# Patient Record
Sex: FEMALE | URBAN - METROPOLITAN AREA
[De-identification: names, ages, dates, MRNs, and addresses within clinical notes are randomized per-mention and may not be internally consistent; named-entity substitution may affect disease eponyms.]

---

## 2023-01-18 ENCOUNTER — OFFICE VISIT (OUTPATIENT)
Dept: SURGERY | Age: 43
End: 2023-01-18

## 2023-01-18 VITALS
SYSTOLIC BLOOD PRESSURE: 109 MMHG | TEMPERATURE: 98.2 F | WEIGHT: 184 LBS | DIASTOLIC BLOOD PRESSURE: 66 MMHG | RESPIRATION RATE: 18 BRPM | HEART RATE: 60 BPM | BODY MASS INDEX: 32.6 KG/M2 | OXYGEN SATURATION: 98 % | HEIGHT: 63 IN

## 2023-01-18 DIAGNOSIS — R63.5 WEIGHT GAIN: Primary | ICD-10-CM

## 2023-01-18 DIAGNOSIS — Z98.84 H/O GASTRIC BYPASS: ICD-10-CM

## 2023-01-18 PROCEDURE — 99203 OFFICE O/P NEW LOW 30 MIN: CPT | Performed by: SURGERY

## 2023-01-18 RX ORDER — LEVETIRACETAM 500 MG/1
TABLET ORAL 2 TIMES DAILY
COMMUNITY

## 2023-01-18 RX ORDER — CITALOPRAM 40 MG/1
40 TABLET, FILM COATED ORAL DAILY
COMMUNITY

## 2023-01-18 RX ORDER — BUSPIRONE HYDROCHLORIDE 10 MG/1
10 TABLET ORAL 3 TIMES DAILY
COMMUNITY

## 2023-01-18 RX ORDER — LEVONORGESTREL AND ETHINYL ESTRADIOL 0.1-0.02MG
KIT ORAL
COMMUNITY

## 2023-01-18 NOTE — PROGRESS NOTES
Identified pt with two pt identifiers (name and ). Reviewed chart in preparation for visit and have obtained necessary documentation. Dorinda Anna is a 38 y.o. female  Chief Complaint   Patient presents with    New Patient     Discuss revision      Visit Vitals  /66 (BP 1 Location: Left arm, BP Patient Position: Sitting, BP Cuff Size: Adult)   Pulse 60   Temp 98.2 °F (36.8 °C) (Oral)   Resp 18   Ht 5' 3\" (1.6 m)   Wt 184 lb (83.5 kg)   SpO2 98%   BMI 32.59 kg/m²       1. Have you been to the ER, urgent care clinic since your last visit? Hospitalized since your last visit? No    2. Have you seen or consulted any other health care providers outside of the [address] [place] [address] since your last visit? Include any pap smears or colon screening.  No

## 2023-01-18 NOTE — PROGRESS NOTES
[address] [facility] at [facility] Surgery History and Physical    History of Present Illness:      Amira Chisholm is a 38 y.o. female who has a history of gastric bypass done in [place] at [facility].  She lost about 80 pounds with her original gastric bypass. She has gained about 40 to 50 pounds of those back. She is interested in considering revisional Surgery If It Is Indicated. In General She Has Gotten off Track with Her Eating Periodically at Times. She Denies Any Nausea Vomiting No GI Related Symptoms, No GERD. She Is Taking Vitamins Still. She Says She Often Eats Sweets and Sometimes Gets Dumping Syndrome from This. Past Medical History:   Diagnosis Date    Anemia     Depression     Psychotic disorder [facility])        Past Surgical History:   Procedure Laterality Date    HX GASTRECTOMY  2013    Gastric Bypass         Current Outpatient Medications:     citalopram (CeleXA) 40 mg tablet, Take 40 mg by mouth daily. , Disp: , Rfl:     busPIRone (BUSPAR) 10 mg tablet, Take 10 mg by mouth three (3) times daily. , Disp: , Rfl:     levETIRAcetam (Keppra) 500 mg tablet, Take  by mouth two (2) times a day., Disp: , Rfl:     levonorgest-eth.estradioL-iron (Balcoltra) 0.1 mg-0.02 mg (21)/36.5 mg(7) tab, Take  by mouth., Disp: , Rfl:     Not on File    Social History     Socioeconomic History    Marital status:      Spouse name: Not on file    Number of children: Not on file    Years of education: Not on file    Highest education level: Not on file   Occupational History    Not on file   Tobacco Use    Smoking status: Never    Smokeless tobacco: Never   Substance and Sexual Activity    Alcohol use: Not on file    Drug use: Not on file    Sexual activity: Not on file   Other Topics Concern    Not on file   Social History Narrative    Not on file     Social Determinants of Health     Financial Resource Strain: Not on file   Food Insecurity: Not on file   Transportation Needs: Not on file   Physical Activity: Not on file   Stress: Not on file   Social Connections: Not on file   Intimate Partner Violence: Not on file   Housing Stability: Not on file       Family History   Problem Relation Age of Onset    Hypertension Maternal Grandmother     Diabetes Maternal Grandfather        ROS   Constitutional: negative  Ears, Nose, Mouth, Throat, and Face: negative  Respiratory: negative  Cardiovascular: negative  Gastrointestinal: negative  Genitourinary:negative  Integument/Breast: negative  Hematologic/Lymphatic: negative  Behavioral/Psychiatric: negative  Allergic/Immunologic: negative      Physical Exam:     Visit Vitals  /66 (BP 1 Location: Left arm, BP Patient Position: Sitting, BP Cuff Size: Adult)   Pulse 60   Temp 98.2 °F (36.8 °C) (Oral)   Resp 18   Ht 5' 3\" (1.6 m)   Wt 184 lb (83.5 kg)   SpO2 98%   BMI 32.59 kg/m²       General - alert and oriented, no apparent distress  HEENT - no jaundice, no hearing imparement  Pulm - CTAB, no C/W/R  CV - RRR, no M/R/G  Abd -soft, nondistended, bowel sounds present, nontender to palpation  Ext - pulses intact in UE and LE bilaterally, no edema  Skin - supple, no rashes  Psychiatric - normal affect, good mood    Labs  none    Imaging  none  I have reviewed and agree with all of the pertinent images    Assessment:     Bonita Jackson is a 38 y.o. female with weight regain after gastric bypass surgery    Recommendations:     1. She has gained a total of about 40 to 50 pounds after her gastric bypass surgery. She said her high weight was 220 pounds when she had a gastric bypass. At her current BMI she is not a candidate for revisional surgery. In general her weight is low but she has had some weight regain. I am referring her to our back on track program with our dietitians and then to also to our medical weight loss team to help and see if there are any specific diets and medications that could help her with her weight regain issues.   I do not think revisional surgery would give her much weight weight loss and she also does not meet criteria either. Trent Yoon MD    Greater than half of the time: 30 minutes was used in counciling the patient about diagnosis and treatment plan    Ms. Horton has a reminder for a \"due or due soon\" health maintenance. I have asked that she contact her primary care provider for follow-up on this health maintenance.

## 2023-01-23 ENCOUNTER — TELEPHONE (OUTPATIENT)
Dept: SURGERY | Age: 43
End: 2023-01-23

## 2023-01-23 NOTE — TELEPHONE ENCOUNTER
Called patient regarding referral to our [address] [place] [address] [facility]. Patient did not answer so I left a vmail with our contact information.

## 2023-01-25 DIAGNOSIS — Z76.89 ENCOUNTER FOR WEIGHT MANAGEMENT: Primary | ICD-10-CM

## 2023-01-25 DIAGNOSIS — E66.01 MORBID OBESITY (HCC): ICD-10-CM

## 2023-01-26 NOTE — PROGRESS NOTES
Patient attended our VIRTUAL Medically Supervised Weight Loss New Patient Orientation on Wednesday January 25, 2023 where we discussed:  New Direction Very Low and the Low Calorie diet details  Medical Supervision  Nutrition education  Cost of Meal Replacements

## 2023-03-01 ENCOUNTER — HOSPITAL ENCOUNTER (OUTPATIENT)
Dept: NON INVASIVE DIAGNOSTICS | Age: 43
Discharge: HOME | End: 2023-03-01
Payer: COMMERCIAL

## 2023-03-01 ENCOUNTER — OFFICE VISIT (OUTPATIENT)
Dept: SURGERY | Age: 43
End: 2023-03-01
Payer: COMMERCIAL

## 2023-03-01 VITALS
SYSTOLIC BLOOD PRESSURE: 124 MMHG | BODY MASS INDEX: 32.43 KG/M2 | HEIGHT: 63 IN | TEMPERATURE: 98.3 F | WEIGHT: 183 LBS | HEART RATE: 60 BPM | DIASTOLIC BLOOD PRESSURE: 78 MMHG | OXYGEN SATURATION: 97 %

## 2023-03-01 DIAGNOSIS — E66.9 OBESE BODY HABITUS: ICD-10-CM

## 2023-03-01 DIAGNOSIS — E66.9 OBESE BODY HABITUS: Primary | ICD-10-CM

## 2023-03-01 DIAGNOSIS — Z87.898 HISTORY OF SEIZURE: ICD-10-CM

## 2023-03-01 DIAGNOSIS — F41.9 ANXIETY AND DEPRESSION: ICD-10-CM

## 2023-03-01 DIAGNOSIS — F32.A ANXIETY AND DEPRESSION: ICD-10-CM

## 2023-03-01 LAB
ATRIAL RATE: 52 BPM
CALCULATED P AXIS, ECG09: 40 DEGREES
CALCULATED R AXIS, ECG10: 21 DEGREES
CALCULATED T AXIS, ECG11: 17 DEGREES
DIAGNOSIS, 93000: NORMAL
P-R INTERVAL, ECG05: 142 MS
Q-T INTERVAL, ECG07: 474 MS
QRS DURATION, ECG06: 82 MS
QTC CALCULATION (BEZET), ECG08: 440 MS
VENTRICULAR RATE, ECG03: 52 BPM

## 2023-03-01 PROCEDURE — 99203 OFFICE O/P NEW LOW 30 MIN: CPT | Performed by: INTERNAL MEDICINE

## 2023-03-01 PROCEDURE — 93005 ELECTROCARDIOGRAM TRACING: CPT

## 2023-03-01 NOTE — PROGRESS NOTES
Identified pt with two pt identifiers (name and ). Reviewed chart in preparation for visit and have obtained necessary documentation. Noemí Nunez is a 38 y.o. female  Chief Complaint   Patient presents with    New Patient    Weight Management     Visit Vitals  /78 (BP 1 Location: Left upper arm, BP Patient Position: Sitting, BP Cuff Size: Large adult)   Pulse 60   Temp 98.3 °F (36.8 °C) (Oral)   Ht 5' 3\" (1.6 m)   Wt 183 lb (83 kg)   SpO2 97%   BMI 32.42 kg/m²       1. Have you been to the ER, urgent care clinic since your last visit? Hospitalized since your last visit? No    2. Have you seen or consulted any other health care providers outside of the [address] [place] [address] since your last visit? Include any pap smears or colon screening.  No

## 2023-03-01 NOTE — PROGRESS NOTES
[address] [facility] [employer]. HISTORY OF PRESENT ILLNESS  Zenia Catalan is a 38 y.o. female. Patient was seen at the medical weight center. PMH of seizures (last 2020), it was suggested this was induced by anxiety. Is being treated and regularly followed by psych. Is on Keppra 500 mg. Has the gastric sleeve done about 10 years ago. Has gained about 30-50 lbs back. Was seen by bariatrics and is not a candidate for a revision. Patient comes in asking for medications. Has also tried [employer] in the past.   Notes that she would like to lose at least 50 lbs. Starting weight today is 183 lbs. Notes that she will not be complaint with replacements. Has tried OTC like Allo before. 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 Notes that she needs to work on water intake. And sleep s around 8 hours or more daily. New Patient  Pertinent negatives include no headaches. Weight Management  Pertinent negatives include no headaches. Visit Vitals  /78 (BP 1 Location: Left upper arm, BP Patient Position: Sitting, BP Cuff Size: Large adult)   Pulse 60   Temp 98.3 °F (36.8 °C) (Oral)   Ht 5' 3\" (1.6 m)   Wt 183 lb (83 kg)   SpO2 97%   BMI 32.42 kg/m²     Past Medical History:   Diagnosis Date    Anemia     Depression     Psychotic disorder [facility])      Past Surgical History:   Procedure Laterality Date    HX GASTRECTOMY  2013    Gastric Bypass     Family History   Problem Relation Age of Onset    Hypertension Maternal Grandmother     Diabetes Maternal Grandfather      Outpatient Encounter Medications as of 3/1/2023   Medication Sig Dispense Refill    citalopram (CELEXA) 40 mg tablet Take 40 mg by mouth daily. busPIRone (BUSPAR) 10 mg tablet Take 10 mg by mouth three (3) times daily. Reports only taking twice a day      levETIRAcetam (KEPPRA) 500 mg tablet Take  by mouth two (2) times a day. levonorgest-eth.estradioL-iron (Balcoltra) 0.1 mg-0.02 mg (21)/36.5 mg(7) tab Take  by mouth.        No facility-administered encounter medications on file as of 3/1/2023. Review of Systems   Constitutional:  Positive for weight gain. Respiratory: Negative. Cardiovascular: Negative. Genitourinary: Negative. Musculoskeletal: Negative. Neurological:  Negative for dizziness, speech change, seizures and headaches. Psychiatric/Behavioral:  Positive for depression. The patient is nervous/anxious. Physical Exam  Vitals and nursing note reviewed. Constitutional:       General: She is not in acute distress. Eyes:      Pupils: Pupils are equal, round, and reactive to light. Cardiovascular:      Rate and Rhythm: Normal rate and regular rhythm. Pulmonary:      Effort: Pulmonary effort is normal.      Breath sounds: Normal breath sounds. Abdominal:      General: Bowel sounds are normal.      Palpations: Abdomen is soft. Skin:     General: Skin is warm. Neurological:      Mental Status: She is alert and oriented to person, place, and time. Sensory: No sensory deficit. Motor: No weakness. Psychiatric:         Behavior: Behavior normal.       ASSESSMENT and PLAN  Diagnoses and all orders for this visit:    1. Obese body habitus  -     METABOLIC PANEL, COMPREHENSIVE; Future  -     CORTISOL; Future  -     URIC ACID; Future  -     TSH 3RD GENERATION; Future  -     HEMOGLOBIN A1C WITH EAG; Future  -     EKG, 12 LEAD, INITIAL; Future        -     will consider a low dose of phentermine 15 mg daily of labs and EKG are stable. Reviewed with patient the dietary approach she needed. Water intake minimally needs to be around 72 oz daily   2. History of seizure    3.  Anxiety and depression      Follow-up and Dispositions    Return in about 1 month (around 4/1/2023), or if symptoms worsen or fail to improve.       lab results and schedule of future lab studies reviewed with patient  reviewed diet, exercise and weight control  cardiovascular risk and specific lipid/LDL goals reviewed  reviewed medications and side effects in detail

## 2023-03-02 ENCOUNTER — TELEPHONE (OUTPATIENT)
Dept: SURGERY | Age: 43
End: 2023-03-02

## 2023-03-02 LAB
ALBUMIN SERPL-MCNC: 3.8 G/DL (ref 3.8–4.8)
ALBUMIN/GLOB SERPL: 1.3 {RATIO} (ref 1.2–2.2)
ALP SERPL-CCNC: 61 IU/L (ref 44–121)
ALT SERPL-CCNC: 14 IU/L (ref 0–32)
AST SERPL-CCNC: 18 IU/L (ref 0–40)
BILIRUB SERPL-MCNC: 0.3 MG/DL (ref 0–1.2)
BUN SERPL-MCNC: 9 MG/DL (ref 6–24)
BUN/CREAT SERPL: 14 (ref 9–23)
CALCIUM SERPL-MCNC: 9.1 MG/DL (ref 8.7–10.2)
CHLORIDE SERPL-SCNC: 104 MMOL/L (ref 96–106)
CO2 SERPL-SCNC: 22 MMOL/L (ref 20–29)
CORTIS SERPL-MCNC: 7.5 UG/DL
CREAT SERPL-MCNC: 0.63 MG/DL (ref 0.57–1)
EGFRCR SERPLBLD CKD-EPI 2021: 115 ML/MIN/1.73
EST. AVERAGE GLUCOSE BLD GHB EST-MCNC: 108 MG/DL
GLOBULIN SER CALC-MCNC: 2.9 G/DL (ref 1.5–4.5)
GLUCOSE SERPL-MCNC: 92 MG/DL (ref 70–99)
HBA1C MFR BLD: 5.4 % (ref 4.8–5.6)
POTASSIUM SERPL-SCNC: 4.2 MMOL/L (ref 3.5–5.2)
PROT SERPL-MCNC: 6.7 G/DL (ref 6–8.5)
SODIUM SERPL-SCNC: 139 MMOL/L (ref 134–144)
TSH SERPL DL<=0.005 MIU/L-ACNC: 0.56 UIU/ML (ref 0.45–4.5)
URATE SERPL-MCNC: 3.4 MG/DL (ref 2.6–6.2)

## 2023-03-02 RX ORDER — PHENTERMINE HYDROCHLORIDE 15 MG/1
15 CAPSULE ORAL
Qty: 30 CAPSULE | Refills: 1 | Status: SHIPPED | OUTPATIENT
Start: 2023-03-02

## 2023-03-02 NOTE — TELEPHONE ENCOUNTER
Pt was called verified using 2 patient identifiers. She was made aware that the results were normal. Pt had a question about the medication that was discussed at yesterday's visit. Informed her that JOVI Delgado has sent the phentermine to [facility] pharmacy that's listed on file.

## 2023-04-05 ENCOUNTER — OFFICE VISIT (OUTPATIENT)
Dept: SURGERY | Age: 43
End: 2023-04-05
Payer: COMMERCIAL

## 2023-04-05 PROCEDURE — 99214 OFFICE O/P EST MOD 30 MIN: CPT | Performed by: INTERNAL MEDICINE

## 2023-04-05 RX ORDER — PHENTERMINE HYDROCHLORIDE 30 MG/1
30 CAPSULE ORAL
Qty: 30 CAPSULE | Refills: 1 | Status: SHIPPED
Start: 2023-04-05

## 2023-04-05 NOTE — PROGRESS NOTES
[address] [facility] weight [place]  HISTORY OF PRESENT ILLNESS  Amari Painter is a 38 y.o. female. Patient was seen for a follow up at the [facility] weight center. Has been on phentermine 15 mg to help with appetite and curve of cravings. Reports that she feels like this is helping. Denies any SE. This is not keeping her up at night. Describes that her sleep is around 6 hours nightly or more. Has a history of gastric bypass 10 years ago and she is not up for a revision. Has been on [employer] in the past and currently she is doing more of a low carb diet. Is trying to get in more activity. Is likely going to have to join a gym. Has been taking her dogs walking but during the Summer months, the breeds cannot be out too long. Is down 10 lbs since last visit. Wanted to lose more. Weight Management  Pertinent negatives include no chest pain. Visit Vitals  /76 (BP 1 Location: Right arm, BP Patient Position: Sitting, BP Cuff Size: Large adult)   Pulse 67   Temp 98.3 °F (36.8 °C) (Oral)   Resp 18   Ht 5' 3\" (1.6 m)   Wt 173 lb 14.4 oz (78.9 kg)   LMP 03/29/2023 (Exact Date)   SpO2 97%   BMI 30.81 kg/m²     Past Medical History:   Diagnosis Date    Anemia     Depression     Psychotic disorder [facility])      Past Surgical History:   Procedure Laterality Date    HX GASTRECTOMY  2013    Gastric Bypass     Family History   Problem Relation Age of Onset    Hypertension Maternal Grandmother     Diabetes Maternal Grandfather      Outpatient Encounter Medications as of 4/5/2023   Medication Sig Dispense Refill    phentermine 30 mg capsule Take 1 Capsule by mouth every morning. Max Daily Amount: 30 mg. 30 Capsule 1    citalopram (CELEXA) 40 mg tablet Take 1 Tablet by mouth daily. busPIRone (BUSPAR) 10 mg tablet Take 1 Tablet by mouth two (2) times a day. levETIRAcetam (KEPPRA) 500 mg tablet Take 1 Tablet by mouth two (2) times a day.       levonorgest-eth.estradioL-iron (Balcoltra) 0.1 mg-0.02 mg (21)/36.5 mg(7) tab Take 1 Tablet by mouth daily. [DISCONTINUED] phentermine (ADIPEX_P) 15 mg capsule Take 1 Capsule by mouth every morning. Max Daily Amount: 15 mg. 30 Capsule 1     No facility-administered encounter medications on file as of 4/5/2023. Review of Systems   Constitutional:  Positive for weight gain. Negative for chills and fever. Respiratory: Negative. Cardiovascular:  Negative for chest pain and palpitations. Gastrointestinal:  Negative for constipation. Musculoskeletal: Negative. Neurological: Negative. Psychiatric/Behavioral: Negative. Physical Exam  Vitals and nursing note reviewed. Constitutional:       Appearance: She is obese. Cardiovascular:      Rate and Rhythm: Normal rate and regular rhythm. Pulmonary:      Effort: Pulmonary effort is normal.      Breath sounds: Normal breath sounds. Abdominal:      General: Bowel sounds are normal.      Palpations: Abdomen is soft. Skin:     General: Skin is warm. Neurological:      Mental Status: She is alert and oriented to person, place, and time. Psychiatric:         Behavior: Behavior normal.       ASSESSMENT and PLAN  Diagnoses and all orders for this visit:    1. Obese body habitus  -     phentermine 30 mg capsule; Take 1 Capsule by mouth every morning. Max Daily Amount: 30 mg.        -     encouraged patient to eat a low carb diet. Needs to be on 6 oz of protein in each meal and serving of vegetables in each meal. Little to no carbs/starches. Gym at least 3 times a week for at least 45 min daily   2.  Anxiety and depression      Follow-up and Dispositions    Return if symptoms worsen or fail to improve.       lab results and schedule of future lab studies reviewed with patient  reviewed diet, exercise and weight control  reviewed medications and side effects in detail

## 2023-04-05 NOTE — PROGRESS NOTES
Weight Management. 1 month follow up. 1. Have you been to the ER, urgent care clinic since your last visit? Hospitalized since your last visit? No    2. Have you seen or consulted any other health care providers outside of the [address] [place] [address] since your last visit? Include any pap smears or colon screening.  No    BMI - 30.4

## 2023-06-05 DIAGNOSIS — E66.01 CLASS 3 SEVERE OBESITY DUE TO EXCESS CALORIES WITHOUT SERIOUS COMORBIDITY IN ADULT, UNSPECIFIED BMI (HCC): Primary | ICD-10-CM

## 2023-06-05 RX ORDER — PHENTERMINE HYDROCHLORIDE 15 MG/1
15 CAPSULE ORAL EVERY MORNING
Qty: 30 CAPSULE | Refills: 0 | OUTPATIENT
Start: 2023-06-05 | End: 2023-06-07

## 2023-06-05 NOTE — TELEPHONE ENCOUNTER
Patient called in regards to medication refill, patient had to reschedule appointment and she will need a refill before her office visit. Patient would like a call back in regards the refill.

## 2023-06-07 DIAGNOSIS — E66.9 OBESITY, UNSPECIFIED: ICD-10-CM

## 2023-06-07 RX ORDER — PHENTERMINE HYDROCHLORIDE 30 MG/1
CAPSULE ORAL
Qty: 30 CAPSULE | Refills: 1 | Status: SHIPPED | OUTPATIENT
Start: 2023-06-07 | End: 2023-07-07